# Patient Record
Sex: MALE | Race: WHITE | Employment: FULL TIME | URBAN - METROPOLITAN AREA
[De-identification: names, ages, dates, MRNs, and addresses within clinical notes are randomized per-mention and may not be internally consistent; named-entity substitution may affect disease eponyms.]

---

## 2017-01-19 ENCOUNTER — ALLSCRIPTS OFFICE VISIT (OUTPATIENT)
Dept: OTHER | Facility: OTHER | Age: 52
End: 2017-01-19

## 2017-01-30 ENCOUNTER — GENERIC CONVERSION - ENCOUNTER (OUTPATIENT)
Dept: OTHER | Facility: OTHER | Age: 52
End: 2017-01-30

## 2017-05-08 ENCOUNTER — OFFICE VISIT (OUTPATIENT)
Dept: URGENT CARE | Facility: CLINIC | Age: 52
End: 2017-05-08
Payer: COMMERCIAL

## 2017-05-08 PROCEDURE — 99213 OFFICE O/P EST LOW 20 MIN: CPT

## 2017-06-06 ENCOUNTER — ALLSCRIPTS OFFICE VISIT (OUTPATIENT)
Dept: OTHER | Facility: OTHER | Age: 52
End: 2017-06-06

## 2017-10-02 DIAGNOSIS — Z82.49 FAMILY HISTORY OF ISCHEMIC HEART DISEASE AND OTHER DISEASES OF THE CIRCULATORY SYSTEM: ICD-10-CM

## 2017-10-02 DIAGNOSIS — Z12.5 ENCOUNTER FOR SCREENING FOR MALIGNANT NEOPLASM OF PROSTATE: ICD-10-CM

## 2017-10-02 DIAGNOSIS — Z00.00 ENCOUNTER FOR GENERAL ADULT MEDICAL EXAMINATION WITHOUT ABNORMAL FINDINGS: ICD-10-CM

## 2017-10-09 ENCOUNTER — ALLSCRIPTS OFFICE VISIT (OUTPATIENT)
Dept: OTHER | Facility: OTHER | Age: 52
End: 2017-10-09

## 2017-10-10 ENCOUNTER — ALLSCRIPTS OFFICE VISIT (OUTPATIENT)
Dept: OTHER | Facility: OTHER | Age: 52
End: 2017-10-10

## 2018-01-11 NOTE — PROGRESS NOTES
Assessment    1  Encounter for preventive health examination (V70 0) (Z00 00)    Plan  Health Maintenance    · Call [phone] if: You have any warning signs of skin cancer ; Status:Complete;    Done: 89TPI1271   · Call 911 if: You experience a new kind of chest pain (angina) or pressure ;  Status:Complete;   Done: 17GEZ1694   · Always use a seat belt and shoulder strap when riding or driving a motor vehicle ;  Status:Complete;   Done: 69JAN5650   · Begin a limited exercise program ; Status:Complete;   Done: 98LLT0518   · Begin or continue regular aerobic exercise  Gradually work up to at least 3 sessions of 30  minutes of exercise a week ; Status:Complete;   Done: 69RSW7305   · Decreasing the stress in your life may help your condition improve ; Status:Complete;    Done: 74FCN0836   · Eat a low fat and low cholesterol diet ; Status:Complete;   Done: 48ZGN0718   · Eat no more than 30 grams of fat per day ; Status:Complete;   Done: 02KLN9443   · Regular aerobic exercise can help reduce stress ; Status:Complete;   Done: 89JQR4775   · Some eating tips that can help you lose weight ; Status:Complete;   Done: 99VVC6521   · Stretch and warm up your muscles during the first 10 minutes , then cool down your  muscles for the last 10 minutes of exercise ; Status:Complete;   Done: 41EJA0061   · Use a sun block product with an SPF of 15 or more ; Status:Complete;   Done:  79CLZ8894   · We encourage all of our patients to exercise regularly    30 minutes of exercise or physical  activity five or more days a week is recommended for children and adults ;  Status:Complete;   Done: 96THO9335   · We recommend routine visits to a dentist ; Status:Complete;   Done: 93JEV3512   · Follow-up visit in 1 year Evaluation and Treatment  Follow-up  Status: Hold For -  Scheduling  Requested for: 10Oct2017  Insomnia    · Zolpidem Tartrate 5 MG Oral Tablet   · TraZODone HCl - 50 MG Oral Tablet; TAKE 1 TABLET Bedtime  Multiple nevi    · 2 - Franki CUEVAS, Diamond Hyde (Dermatology) Co-Management  *  Status: Active  Requested  for: 61ANN9099  Care Summary provided  : Yes  Screening for colon cancer    · COLONOSCOPY; Status:Active; Requested for:10Oct2017;   SocHx: Current Every Day Smoker    · You need to quit smoking ; Status:Complete;   Done: 76JHY3664  SocHx: Current Every Day Smoker, FamHx: Family history of cardiac disorder    · CT CORONARY CALCIUM SCORE; Status:Active; Requested for:10Oct2017;     Discussion/Summary  health maintenance visit Currently, he eats a healthy diet and has an adequate exercise regimen  Prostate cancer screening: the risks and benefits of prostate cancer screening were discussed and prostate cancer screening is current  Testicular cancer screening: the risks and benefits of testicular cancer screening were discussed and self testicular exam technique was taught  Colorectal cancer screening: the risks and benefits of colorectal cancer screening were discussed and colonoscopy has been ordered  The risks and benefits of immunizations were discussed and patient declines immunizations  He was advised to be evaluated by an optometrist and a dentist  Advice and education were given regarding nutrition, self skin examination, aerobic exercise, cardiovascular risk reduction, weight bearing exercise, seat belt use, weight loss, fall risk reduction, calcium supplements, sunscreen use, advanced directive planning and vitamin D supplements  Kat Avery is stable on his current medications  We are going to discontinue the zolpidem and change in to the trazodone see if that helps better with his sleep  He will go for the testing as ordered  He is going to work on smoking cessation  He will continue to work on his diet and exercise  We will see him back as scheduled  Possible side effects of new medications were reviewed with the patient/guardian today  The treatment plan was reviewed with the patient/guardian   The patient/guardian understands and agrees with the treatment plan      Chief Complaint  Complete Physical 47year old male      History of Present Illness  HM, Adult Male: The patient is being seen for a health maintenance evaluation  General Health: The patient's health since the last visit is described as good  He does not have regular dental visits  He denies vision problems  Vision care includes wearing glasses and an eye examination within the last year  He denies hearing loss  Lifestyle:  He consumes a diverse and healthy diet  He does not have any weight concerns  He exercises regularly  He uses tobacco  The patient is a current cigarette smoker  Tobacco Use Duration: 1/2 cigarette pack(s) per day  He is ready to quit using tobacco, does not want resources to help with quitting and He is working on weaning of the cigarettes and is going to take Chantix  He denies alcohol use  He denies drug use  Reproductive health:  the patient is not sexually active  birth control is not being practiced  He denies erectile dysfunction  Screening: cancer screening reviewed and current  metabolic screening reviewed and current  risk screening reviewed and current  HPI: Jeremy Krause is a 50-year-old male who presents today for a complete physical  He is feeling well  The patient denies any chest pain, shortness of breath, or palpitations  There is no edema  There are no headaches or visual changes  There is no lightheadedness, dizziness, or fainting spells  There is no abdominal pain  There is no chest pain or dyspnea with exertion  He is doing well on his medication  He is still having trouble falling asleep and staying asleep  The zolpidem is no longer working for him  He is interested in alternative  He is working on trying to quit smoking on his own by gradually weaning off of the cigarettes  He does have Chantix at home which she is going to restart        Review of Systems    Constitutional: No fever or chills, feels well, no tiredness, no recent weight gain or weight loss  Eyes: No complaints of eye pain, no red eyes, no discharge from eyes, no itchy eyes  ENT: no complaints of earache, no hearing loss, no nosebleeds, no nasal discharge, no sore throat, no hoarseness  Cardiovascular: No complaints of slow heart rate, no fast heart rate, no chest pain, no palpitations, no leg claudication, no lower extremity  Respiratory: No complaints of shortness of breath, no wheezing, no cough, no SOB on exertion, no orthopnea or PND  Gastrointestinal: No complaints of abdominal pain, no constipation, no nausea or vomiting, no diarrhea or bloody stools  Genitourinary: No complaints of dysuria, no incontinence, no hesitancy, no nocturia, no genital lesion, no testicular pain  Musculoskeletal: as noted in HPI  Active Problems    1  Allergic rhinitis (477 9) (J30 9)   2  Anxiety disorder (300 00) (F41 9)   3  Blood tests for routine general physical examination (V72 62) (Z00 00)   4  COPD (chronic obstructive pulmonary disease) (496) (J44 9)   5  COPD exacerbation (491 21) (J44 1)   6  Current Every Day Smoker (305 1)   7  Insomnia (780 52) (G47 00)   8   Nicotine dependence (305 1) (F17 200)    Past Medical History    · History of Anxiety disorder (300 00) (F41 9)   · History of Cough (786 2) (R05)   · History of Degeneration of cervical intervertebral disc (722 4) (M50 90)   · History of Head Injury (959 01)   · History of acute sinusitis (V12 69) (Z87 09)   · History of depression (V11 8) (Z86 59)   · History of gastroenteritis (V12 79) (Z87 19)   · History of influenza (V12 09) (Z87 09)   · History of sinusitis (V12 69) (Z87 09)   · History of viral gastroenteritis (V12 09) (Z86 19)   · History of Impingement syndrome of left shoulder (726 2) (M75 42)   · History of Left Shoulder Strain (840 9)   · History of Right Shoulder Strain (840 9)   · History of Strain of left shoulder, initial encounter (840 9) (S03 464J)    Surgical History    · History of Hand Surgery    Family History  Mother    · Family history of cardiac disorder (V17 49) (Z80 55)  Family History    · Family history of cardiac disorder (V17 49) (Z82 49)    Social History    · Alcohol use   · Current Every Day Smoker (305 1)   · 1/2-3/4 ppd  · Employed in professional specialty position   · Lives with spouse   ·    · No drug use   · Denied: History of Uses  drugs    Current Meds   1  BusPIRone HCl - 15 MG Oral Tablet; TAKE 1 TABLET TWICE DAILY; Therapy: 87BYS4388 to (Evaluate:07Apr2018)  Requested for: 73BAA5054; Last   Rx:09Oct2017 Ordered   2  Zolpidem Tartrate 5 MG Oral Tablet; TAKE 1 TABLET AT  BEDTIME AS NEEDED FOR   INSOMNIA; Therapy: 55Hkd4564 to (Evaluate:07Jan2018)  Requested for: 81JMI5766; Last   Rx:09Oct2017 Ordered    Allergies    1  No Known Allergies    Vitals   Recorded: 18AOT1443 05:23PM   Temperature 99 1 F, Tympanic   Heart Rate 96, R Radial   Pulse Quality Regular, R Radial   Systolic 111, RUE, Sitting   Diastolic 80, RUE, Sitting   Height 5 ft 9 in   Weight 178 lb    BMI Calculated 26 29   BSA Calculated 1 97     Physical Exam    Constitutional   General appearance: No acute distress, well appearing and well nourished  Eyes   Conjunctiva and lids: No erythema, swelling or discharge  Pupils and irises: Equal, round, reactive to light  Ophthalmoscopic examination: Normal fundi and optic discs  Ears, Nose, Mouth, and Throat   External inspection of ears and nose: Normal     Otoscopic examination: Tympanic membranes translucent with normal light reflex  Canals patent without erythema  Hearing: Normal     Nasal mucosa, septum, and turbinates: Normal without edema or erythema  Lips, teeth, and gums: Normal, good dentition  Oropharynx: Normal with no erythema, edema, exudate or lesions  Neck   Neck: Supple, symmetric, trachea midline, no masses  Thyroid: Normal, no thyromegaly      Pulmonary   Respiratory effort: No increased work of breathing or signs of respiratory distress  Percussion of chest: Normal     Palpation of chest: Normal     Auscultation of lungs: Clear to auscultation  Cardiovascular   Palpation of heart: Normal PMI, no thrills  Auscultation of heart: Normal rate and rhythm, normal S1 and S2, no murmurs  Carotid pulses: 2+ bilaterally  Abdominal aorta: Normal     Femoral pulses: 2+ bilaterally  Pedal pulses: 2+ bilaterally  Examination of extremities for edema and/or varicosities: Normal     Chest   Breasts: Normal, no dimpling or skin changes appreciated  Palpation of breasts and axillae: Normal, no masses palpated  Chest: Normal     Abdomen   Abdomen: Non-tender, no masses  Liver and spleen: No hepatomegaly or splenomegaly  Examination for hernias: No hernias appreciated  Anus, perineum, and rectum: Normal sphincter tone, no masses, no prolapse  Stool sample for occult blood: Negative  Genitourinary   Scrotal contents: Normal testes, no masses  Penis: Normal, no lesions  Digital rectal exam of prostate: Normal size, no masses  Lymphatic   Palpation of lymph nodes in neck: No lymphadenopathy  Palpation of lymph nodes in axillae: No lymphadenopathy  Palpation of lymph nodes in groin: No lymphadenopathy  Palpation of lymph nodes in other areas: No lymphadenopathy  Musculoskeletal   Gait and station: Normal     Inspection/palpation of digits and nails: Normal without clubbing or cyanosis  Inspection/palpation of joints, bones, and muscles: Normal     Range of motion: Normal     Stability: Normal     Muscle strength/tone: Normal     Skin   Skin and subcutaneous tissue: Normal without rashes or lesions  Palpation of skin and subcutaneous tissue: Normal turgor  Neurologic   Cranial nerves: Cranial nerves 2-12 intact  Reflexes: 2+ and symmetric  Sensation: No sensory loss      Psychiatric   Judgment and insight: Normal     Orientation to person, place and time: Normal     Recent and remote memory: Intact  Mood and affect: Normal        Procedure    Procedure: Visual Acuity Test    Indication: routine screening  Inforrmation supplied by a Snellen chart  Results: 20/30 in both eyes with corrective device, 20/30 in the right eye with corrective device, 20/100 in the left eye with corrective device 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

## 2018-01-12 VITALS
HEIGHT: 69 IN | RESPIRATION RATE: 16 BRPM | WEIGHT: 182 LBS | HEART RATE: 100 BPM | BODY MASS INDEX: 26.96 KG/M2 | SYSTOLIC BLOOD PRESSURE: 122 MMHG | DIASTOLIC BLOOD PRESSURE: 86 MMHG | TEMPERATURE: 97.9 F

## 2018-01-14 VITALS
HEART RATE: 96 BPM | BODY MASS INDEX: 26.36 KG/M2 | HEIGHT: 69 IN | DIASTOLIC BLOOD PRESSURE: 80 MMHG | WEIGHT: 178 LBS | TEMPERATURE: 99.1 F | SYSTOLIC BLOOD PRESSURE: 120 MMHG

## 2018-01-15 VITALS
TEMPERATURE: 99.8 F | WEIGHT: 179 LBS | DIASTOLIC BLOOD PRESSURE: 90 MMHG | HEART RATE: 114 BPM | BODY MASS INDEX: 26.51 KG/M2 | HEIGHT: 69 IN | SYSTOLIC BLOOD PRESSURE: 110 MMHG

## 2018-01-15 NOTE — MISCELLANEOUS
Message   Recorded as Task   Date: 08/02/2016 03:55 PM, Created By: Adalid Rhodes   Task Name: Med Renewal Request   Assigned To: Mónica Corcoran   Regarding Patient: Rogelio Lazcano, Status: Active   Comment: Adalid Rhodes - 81 Aug 2016 3:55 PM     TASK CREATED  Caller: Self; Renew Medication; [phone] (Home); [phone] (Work)  PT NEEDS BUSPIRONE AND AMBIEN  TO [facility] ALMOST ALL OUT OF MEDS   Candelaria Rain - 02 Aug 2016 5:17 PM     TASK REASSIGNED: Previously Assigned To Candelaria Rain      Call in the [address], [address], [facility]-I sent inthe Buspar  The patient is due for a physical and labs  He should schedule before his next med refill  Mónica Corcoran - 02 Aug 2016 6:56 PM     TASK EDITED  Message left - call office  PLM   Mónica Corcoran - 03 Aug 2016 4:01 PM     TASK EDITED  Patient advised  PLM        Active Problems    1  Adult Onset Fluency Disorder (307 0)   2  Anxiety disorder (300 00) (F41 9)   3  Degeneration of cervical intervertebral disc (722 4) (M50 90)   4  Head Injury (959 01)   5  Impingement syndrome of left shoulder (726 2) (M75 42)   6  Insomnia (780 52) (G47 00)   7  Left shoulder pain (719 41) (M25 512)   8  Lower back pain (724 2) (M54 5)   9  Neck pain (723 1) (M54 2)   10  Other muscle spasm (728 85) (M62 838)   11  Strain of left shoulder, initial encounter (840 9) (S94 449M)    Current Meds   1  BusPIRone HCl - 15 MG Oral Tablet; TAKE 1 TABLET TWICE DAILY; Therapy: 33UAD5118 to (Evaluate:29Jan2017)  Requested for: 84Fwi4046; Last   Rx:42Exh7177 Ordered   2  MethylPREDNISolone (Karan) 4 MG TABS; TAKE AS DIRECTED ON PATIENT   INSTRUCTION CARD; Therapy: 86XKR1877 to (Last Rx:05Oct2015)  Requested for: 05Oct2015 Ordered   3  Zolpidem Tartrate 5 MG Oral Tablet; TAKE 1 TABLET AT  BEDTIME AS NEEDED FOR   INSOMNIA; Therapy: 34Ksy7807 to (Evaluate:31Oct2016)  Requested for: 09Vyf6651; Last   Rx:87Lmv9503 Ordered    Allergies    1   No Known Allergies    Signatures Electronically signed by : Solis Orozco, ; Aug  3 2016  4:01PM EST                       (Author)

## 2018-01-17 NOTE — MISCELLANEOUS
Date: 01/30/2017   Dear Ty Tubbs:     We have attempted to reach you regarding your upcoming appointment on 03/21/17 and with  [facility]     Unfortunately, due to a change in the provider's schedule we need to change your appointment  Please call our office as soon as possible so we can reschedule your appointment  We apologize for any inconvenience this may cause  Thank you in advance for your cooperation and assistance       Sincerely,   MESSAGE WAS LEFT 01/18/17      Signatures   Electronically signed by : Andre Flores DO; Jan 30 2017  5:42PM EST                       (Author)

## 2018-03-04 RX ORDER — TRAZODONE HYDROCHLORIDE 50 MG/1
TABLET ORAL
Qty: 30 TABLET | Refills: 2 | OUTPATIENT
Start: 2018-03-04

## 2018-04-03 RX ORDER — BUSPIRONE HYDROCHLORIDE 15 MG/1
TABLET ORAL
Qty: 180 TABLET | Refills: 1 | OUTPATIENT
Start: 2018-04-03

## 2018-05-14 ENCOUNTER — TELEPHONE (OUTPATIENT)
Dept: FAMILY MEDICINE CLINIC | Facility: CLINIC | Age: 53
End: 2018-05-14

## 2018-05-14 DIAGNOSIS — F41.1 GENERALIZED ANXIETY DISORDER: Primary | ICD-10-CM

## 2018-05-14 PROBLEM — J44.9 COPD (CHRONIC OBSTRUCTIVE PULMONARY DISEASE) (HCC): Status: ACTIVE | Noted: 2017-06-06

## 2018-05-14 PROBLEM — J30.9 ALLERGIC RHINITIS: Status: ACTIVE | Noted: 2017-06-06

## 2018-05-14 PROBLEM — D22.9 MULTIPLE NEVI: Status: ACTIVE | Noted: 2017-10-10

## 2018-05-14 RX ORDER — BUSPIRONE HYDROCHLORIDE 15 MG/1
15 TABLET ORAL 2 TIMES DAILY
Qty: 180 TABLET | Refills: 1 | Status: SHIPPED | OUTPATIENT
Start: 2018-05-14 | End: 2018-12-26 | Stop reason: SDUPTHER

## 2018-05-14 RX ORDER — BUSPIRONE HYDROCHLORIDE 15 MG/1
1 TABLET ORAL 2 TIMES DAILY
COMMUNITY
Start: 2012-11-13 | End: 2018-05-14 | Stop reason: SDUPTHER

## 2018-08-30 DIAGNOSIS — Z11.59 NEED FOR HEPATITIS C SCREENING TEST: ICD-10-CM

## 2018-08-30 DIAGNOSIS — Z13.6 SCREENING FOR CARDIOVASCULAR CONDITION: Primary | ICD-10-CM

## 2018-08-30 DIAGNOSIS — Z12.5 SCREENING FOR PROSTATE CANCER: ICD-10-CM

## 2018-08-30 DIAGNOSIS — Z13.1 SCREENING FOR DIABETES MELLITUS: ICD-10-CM

## 2018-11-16 DIAGNOSIS — F41.1 GENERALIZED ANXIETY DISORDER: ICD-10-CM

## 2018-11-16 RX ORDER — BUSPIRONE HYDROCHLORIDE 15 MG/1
15 TABLET ORAL 2 TIMES DAILY
Qty: 180 TABLET | Refills: 1 | OUTPATIENT
Start: 2018-11-16

## 2018-12-26 DIAGNOSIS — F41.1 GENERALIZED ANXIETY DISORDER: Primary | ICD-10-CM

## 2018-12-26 RX ORDER — TRAZODONE HYDROCHLORIDE 50 MG/1
1 TABLET ORAL
COMMUNITY
Start: 2017-10-10 | End: 2018-12-26 | Stop reason: SDUPTHER

## 2018-12-26 NOTE — TELEPHONE ENCOUNTER
busPIRone (BUSPAR) 15 mg tablet  BID  TRAZIDONE          [facility]  [phone]        [phone]  ANY QUESTIONS

## 2018-12-27 ENCOUNTER — DOCUMENTATION (OUTPATIENT)
Dept: FAMILY MEDICINE CLINIC | Facility: CLINIC | Age: 53
End: 2018-12-27

## 2018-12-27 DIAGNOSIS — F41.1 GENERALIZED ANXIETY DISORDER: ICD-10-CM

## 2018-12-27 RX ORDER — TRAZODONE HYDROCHLORIDE 50 MG/1
50 TABLET ORAL
Qty: 90 TABLET | Refills: 0 | Status: SHIPPED | OUTPATIENT
Start: 2018-12-27 | End: 2018-12-27 | Stop reason: SDUPTHER

## 2018-12-27 RX ORDER — BUSPIRONE HYDROCHLORIDE 30 MG/1
TABLET ORAL
Qty: 90 TABLET | Refills: 0 | Status: SHIPPED | OUTPATIENT
Start: 2018-12-27 | End: 2019-04-01 | Stop reason: SDUPTHER

## 2018-12-27 RX ORDER — TRAZODONE HYDROCHLORIDE 50 MG/1
50 TABLET ORAL
Qty: 90 TABLET | Refills: 0 | Status: SHIPPED | OUTPATIENT
Start: 2018-12-27 | End: 2019-05-15 | Stop reason: SDUPTHER

## 2018-12-27 RX ORDER — BUSPIRONE HYDROCHLORIDE 15 MG/1
15 TABLET ORAL 2 TIMES DAILY
Qty: 180 TABLET | Refills: 0 | Status: SHIPPED | OUTPATIENT
Start: 2018-12-27 | End: 2018-12-27 | Stop reason: SDUPTHER

## 2018-12-27 RX ORDER — BUSPIRONE HYDROCHLORIDE 15 MG/1
15 TABLET ORAL 2 TIMES DAILY
Qty: 180 TABLET | Refills: 1 | Status: SHIPPED | OUTPATIENT
Start: 2018-12-27 | End: 2019-01-15 | Stop reason: ALTCHOICE

## 2018-12-27 NOTE — TELEPHONE ENCOUNTER
I will refill Rx on behalf of Dr Kamilla Smith, however, patient has not been seen in over a year and needs to make an apt with Dr Kamilla Smith his PCP for check up

## 2018-12-27 NOTE — PROGRESS NOTES
Called and left patient a detailed message that scripts were sent to [facility] in [place]  Instructed patient that they only have 30MG of buspirone and he needs to take half a tablet twice daily

## 2019-01-15 ENCOUNTER — OFFICE VISIT (OUTPATIENT)
Dept: FAMILY MEDICINE CLINIC | Facility: CLINIC | Age: 54
End: 2019-01-15
Payer: COMMERCIAL

## 2019-01-15 VITALS
TEMPERATURE: 98.4 F | SYSTOLIC BLOOD PRESSURE: 120 MMHG | WEIGHT: 196 LBS | HEIGHT: 72 IN | OXYGEN SATURATION: 96 % | DIASTOLIC BLOOD PRESSURE: 80 MMHG | BODY MASS INDEX: 26.55 KG/M2 | HEART RATE: 104 BPM

## 2019-01-15 DIAGNOSIS — F51.01 PRIMARY INSOMNIA: ICD-10-CM

## 2019-01-15 DIAGNOSIS — Z12.5 SCREENING FOR PROSTATE CANCER: ICD-10-CM

## 2019-01-15 DIAGNOSIS — R42 LIGHTHEADEDNESS: ICD-10-CM

## 2019-01-15 DIAGNOSIS — Z12.11 SCREENING FOR COLON CANCER: ICD-10-CM

## 2019-01-15 DIAGNOSIS — F17.210 CIGARETTE NICOTINE DEPENDENCE WITHOUT COMPLICATION: ICD-10-CM

## 2019-01-15 DIAGNOSIS — F41.1 GENERALIZED ANXIETY DISORDER: Primary | ICD-10-CM

## 2019-01-15 PROCEDURE — 99214 OFFICE O/P EST MOD 30 MIN: CPT | Performed by: FAMILY MEDICINE

## 2019-01-15 PROCEDURE — 3008F BODY MASS INDEX DOCD: CPT | Performed by: FAMILY MEDICINE

## 2019-01-15 NOTE — PROGRESS NOTES
Assessment/Plan:  1  Generalized anxiety disorder-patient will continue on the buspirone as prescribed  He has had no breakthrough anxiety symptoms  He will go for the lab work as ordered and will follow up with the results  2  Primary insomnia-the patient is doing well on the trazodone and we will continue him on this current dosage  3  History of nicotine dependence-I advised against using Chantix or Wellbutrin for smoking cessation due to the patient's history of anxiety  He is going to try the over-the-counter patch to help with smoking cessation  He will gradually wean off of his cigarettes  He will go for the lung cancer screening CT as ordered and will follow up with results  4  Intermittent lightheadedness-the patient will go for the lab work as ordered and will follow up with the results  We will see him back in the office as scheduled  Diagnoses and all orders for this visit:    Generalized anxiety disorder  -     CBC and differential; Future  -     Comprehensive metabolic panel; Future  -     LDL cholesterol, direct; Future  -     Lipid panel; Future  -     TSH, 3rd generation with Free T4 reflex; Future  -     Urinalysis with reflex to microscopic; Future    Primary insomnia  -     CBC and differential; Future  -     Comprehensive metabolic panel; Future  -     LDL cholesterol, direct; Future  -     Lipid panel; Future  -     TSH, 3rd generation with Free T4 reflex; Future  -     Urinalysis with reflex to microscopic; Future    Cigarette nicotine dependence without complication  -     CT lung screening program; Future    Lightheadedness  -     CBC and differential; Future  -     Comprehensive metabolic panel; Future  -     LDL cholesterol, direct; Future  -     Lipid panel; Future  -     TSH, 3rd generation with Free T4 reflex; Future  -     Urinalysis with reflex to microscopic; Future    Screening for prostate cancer  -     PSA, ultrasensitive;  Future    Screening for colon cancer  - Ambulatory referral to Gastroenterology; Future       No Follow-up on file  Subjective:   Chief Complaint   Patient presents with    Follow-up     med check         Patient ID: Ana Aguilar is a 53 y o  male presents today for a routine checkup  Ana Aguilar is a 53 y o  Male who presents for a checkup today  He has been having dizzy episodes over the past 3-4 days  There was no spinning  He felt lightheaded for a few days  He feels better today  The patient denies any chest pain, shortness of breath, or palpitations  There is no edema  There are no headaches or visual changes  There is no lightheadedness, dizziness, or fainting spells  There are no fevers or chills  There is no coughing  There are no GI problems  There is relief with his sleep of the trazodone  His sleep is better  His breathing is good  He needs a colonoscopy  He want's to quit smoking  He is not interested in taking the Chantix because the risk of side effects  He is interested in other options        The following portions of the patient's history were reviewed and updated as appropriate: allergies, current medications, past family history, past medical history, past social history, past surgical history and problem list   Patient Active Problem List   Diagnosis    Allergic rhinitis    Anxiety disorder    COPD (chronic obstructive pulmonary disease) ([facility] [address] )    Insomnia    Multiple nevi    Nicotine dependence     Past Medical History:   Diagnosis Date    Anxiety disorder     last assessed - 41Wef0039    Degeneration of cervical intervertebral disc     last assessed - 83NOU6527    Depression     Gastroenteritis     last assessed - 72Hxg7470    Head injury     last assessed - 97Kig4533    Impingement syndrome of left shoulder     last assessed - 94Vfs5744     Past Surgical History:   Procedure Laterality Date    HAND SURGERY       No Known Allergies  Family History   Problem Relation Age of Onset    Heart disease Mother         Cardiac disorder    Heart disease Family         Cardiac disorder     Social History     Social History    Marital status: /Civil Union     Spouse name: N/A    Number of children: N/A    Years of education: N/A     Occupational History    Professional specialty position      Social History Main Topics    Smoking status: Current Every Day Smoker    Smokeless tobacco: Never Used    Alcohol use Yes    Drug use: No      Comment: Denied history uses  drugs    Sexual activity: Not on file     Other Topics Concern    Not on file     Social History Narrative    Lives with spouse     Current Outpatient Prescriptions on File Prior to Visit   Medication Sig Dispense Refill    busPIRone (BUSPAR) 30 MG tablet Take 1/2 tablet twice daily 90 tablet 0    traZODone (DESYREL) 50 mg tablet Take 1 tablet (50 mg total) by mouth daily at bedtime 90 tablet 0    [DISCONTINUED] busPIRone (BUSPAR) 15 mg tablet Take 1 tablet (15 mg total) by mouth 2 (two) times a day 180 tablet 1     No current facility-administered medications on file prior to visit  Review of Systems   Constitutional: Negative  HENT: Negative  Eyes: Negative  Respiratory: Negative  Cardiovascular: Negative  Gastrointestinal: Negative  Endocrine: Negative  Genitourinary: Negative  Musculoskeletal: Negative  Skin: Negative  Allergic/Immunologic: Negative  Neurological: Negative  Hematological: Negative  Psychiatric/Behavioral: Negative  Objective:  Vitals:    01/15/19 1626   BP: 120/80   BP Location: Left arm   Patient Position: Sitting   Cuff Size: Large   Pulse: 104   Temp: 98 4 °F (36 9 °C)   TempSrc: Tympanic   SpO2: 96%   Weight: 88 9 kg (196 lb)   Height: 6' (1 829 m)     Body mass index is 26 58 kg/m²    Wt Readings from Last 3 Encounters:   01/15/19 88 9 kg (196 lb)   10/10/17 80 7 kg (178 lb)   06/06/17 81 2 kg (179 lb)     Temp Readings from Last 3 Encounters:   01/15/19 98 4 °F (36 9 °C) (Tympanic)   10/10/17 99 1 °F (37 3 °C) (Tympanic)   06/06/17 99 8 °F (37 7 °C) (Tympanic)     BP Readings from Last 3 Encounters:   01/15/19 120/80   10/10/17 120/80   06/06/17 110/90     Pulse Readings from Last 3 Encounters:   01/15/19 104   10/10/17 96   06/06/17 (!) 114        Physical Exam   Constitutional: He is oriented to person, place, and time  He appears well-developed and well-nourished  No distress  Eyes: Pupils are equal, round, and reactive to light  EOM are normal    Neck: Normal range of motion  Neck supple  No JVD present  No tracheal deviation present  No thyromegaly present  Cardiovascular: Normal rate, regular rhythm and normal heart sounds  Exam reveals no gallop and no friction rub  No murmur heard  Pulmonary/Chest: Effort normal and breath sounds normal  No stridor  No respiratory distress  He has no wheezes  He has no rales  He exhibits no tenderness  Abdominal: Soft  Bowel sounds are normal  He exhibits no distension and no mass  There is no tenderness  There is no rebound and no guarding  Musculoskeletal: Normal range of motion  Lymphadenopathy:     He has no cervical adenopathy  Neurological: He is alert and oriented to person, place, and time  He has normal reflexes  No cranial nerve deficit  He exhibits normal muscle tone  Coordination normal    Skin: Skin is warm and dry  No rash noted  He is not diaphoretic  No erythema  No pallor  Nursing note and vitals reviewed

## 2019-01-15 NOTE — PATIENT INSTRUCTIONS

## 2019-04-01 ENCOUNTER — TELEPHONE (OUTPATIENT)
Dept: FAMILY MEDICINE CLINIC | Facility: CLINIC | Age: 54
End: 2019-04-01

## 2019-04-01 DIAGNOSIS — F41.1 GENERALIZED ANXIETY DISORDER: ICD-10-CM

## 2019-04-01 RX ORDER — BUSPIRONE HYDROCHLORIDE 30 MG/1
TABLET ORAL
Qty: 14 TABLET | Refills: 0 | Status: SHIPPED | OUTPATIENT
Start: 2019-04-01 | End: 2019-05-15 | Stop reason: SDUPTHER

## 2019-05-15 DIAGNOSIS — F41.1 GENERALIZED ANXIETY DISORDER: ICD-10-CM

## 2019-05-15 RX ORDER — TRAZODONE HYDROCHLORIDE 50 MG/1
50 TABLET ORAL
Qty: 90 TABLET | Refills: 1 | Status: SHIPPED | OUTPATIENT
Start: 2019-05-15 | End: 2021-08-12 | Stop reason: SDUPTHER

## 2019-05-15 RX ORDER — BUSPIRONE HYDROCHLORIDE 30 MG/1
TABLET ORAL
Qty: 90 TABLET | Refills: 1 | Status: SHIPPED | OUTPATIENT
Start: 2019-05-15 | End: 2019-12-23 | Stop reason: SDUPTHER

## 2019-10-31 DIAGNOSIS — F41.1 GENERALIZED ANXIETY DISORDER: ICD-10-CM

## 2019-10-31 RX ORDER — TRAZODONE HYDROCHLORIDE 50 MG/1
50 TABLET ORAL
Qty: 90 TABLET | Refills: 1 | OUTPATIENT
Start: 2019-10-31

## 2019-10-31 RX ORDER — BUSPIRONE HYDROCHLORIDE 30 MG/1
TABLET ORAL
Qty: 90 TABLET | Refills: 1 | OUTPATIENT
Start: 2019-10-31

## 2019-12-23 ENCOUNTER — TELEPHONE (OUTPATIENT)
Dept: FAMILY MEDICINE CLINIC | Facility: CLINIC | Age: 54
End: 2019-12-23

## 2019-12-23 DIAGNOSIS — F41.1 GENERALIZED ANXIETY DISORDER: ICD-10-CM

## 2019-12-23 RX ORDER — BUSPIRONE HYDROCHLORIDE 30 MG/1
TABLET ORAL
Qty: 90 TABLET | Refills: 1 | Status: SHIPPED | OUTPATIENT
Start: 2019-12-23 | End: 2021-08-12

## 2019-12-23 NOTE — TELEPHONE ENCOUNTER
Patient would like refill of Buspirone 30 mg tab take 1/2 tab twice daily 90 day supply and Trazodone 50 mg tab take 1 tab daily 90 day supply both sent to [facility] in [place]

## 2019-12-29 DIAGNOSIS — F41.1 GENERALIZED ANXIETY DISORDER: ICD-10-CM

## 2019-12-30 RX ORDER — TRAZODONE HYDROCHLORIDE 50 MG/1
50 TABLET ORAL
Qty: 90 TABLET | Refills: 1 | OUTPATIENT
Start: 2019-12-30

## 2020-03-03 ENCOUNTER — OFFICE VISIT (OUTPATIENT)
Dept: FAMILY MEDICINE CLINIC | Facility: CLINIC | Age: 55
End: 2020-03-03
Payer: COMMERCIAL

## 2020-03-03 VITALS
HEART RATE: 100 BPM | SYSTOLIC BLOOD PRESSURE: 110 MMHG | DIASTOLIC BLOOD PRESSURE: 80 MMHG | BODY MASS INDEX: 23.98 KG/M2 | TEMPERATURE: 97.6 F | WEIGHT: 177 LBS | HEIGHT: 72 IN | OXYGEN SATURATION: 95 %

## 2020-03-03 DIAGNOSIS — R11.2 NAUSEA AND VOMITING, INTRACTABILITY OF VOMITING NOT SPECIFIED, UNSPECIFIED VOMITING TYPE: Primary | ICD-10-CM

## 2020-03-03 DIAGNOSIS — K52.9 GASTROENTERITIS: ICD-10-CM

## 2020-03-03 PROCEDURE — 99213 OFFICE O/P EST LOW 20 MIN: CPT | Performed by: FAMILY MEDICINE

## 2020-03-03 PROCEDURE — 3008F BODY MASS INDEX DOCD: CPT | Performed by: FAMILY MEDICINE

## 2020-03-03 RX ORDER — ONDANSETRON 4 MG/1
4 TABLET, ORALLY DISINTEGRATING ORAL EVERY 8 HOURS PRN
Qty: 20 TABLET | Refills: 0 | Status: SHIPPED | OUTPATIENT
Start: 2020-03-03 | End: 2021-08-12

## 2020-03-03 NOTE — PROGRESS NOTES
Garima King 1965 male MRN: [id number]    Family Medicine Acute Visit    ASSESSMENT/PLAN  Problem List Items Addressed This Visit        Digestive    Nausea & vomiting - Primary    Relevant Medications    ondansetron (ZOFRAN-ODT) 4 mg disintegrating tablet    Gastroenteritis                No future appointments  SUBJECTIVE  CC: Sore Throat (Cough, sore throat, headache, diarrheaand vomiting for 4 days)      HPI:  Garima King is a 63 y o  male who presents for sick visit,  Started 4 days ago with sore throat, vomiting, cough  Drink water and this is staying down  Urinating normal  No fever  Review of Systems   Constitutional: Negative for chills and fever  HENT: Positive for sore throat  Negative for congestion, postnasal drip, rhinorrhea and sinus pain  Eyes: Negative for photophobia and visual disturbance  Respiratory: Positive for cough  Negative for shortness of breath  Cardiovascular: Negative for chest pain, palpitations and leg swelling  Gastrointestinal: Positive for diarrhea and nausea  Negative for abdominal pain, constipation and vomiting  Genitourinary: Negative for difficulty urinating and dysuria  Musculoskeletal: Negative for arthralgias and myalgias  Skin: Negative for rash  Neurological: Negative for dizziness and syncope         Historical Information   The patient history was reviewed as follows:  Past Medical History:   Diagnosis Date    Anxiety disorder     last assessed - 21Jun2012    Degeneration of cervical intervertebral disc     last assessed - [id number]    Depression     Gastroenteritis     last assessed - [id number]    Head injury     last assessed - [id number]    Impingement syndrome of left shoulder     last assessed - 05Oct2015         Past Surgical History:   Procedure Laterality Date    HAND SURGERY       Family History   Problem Relation Age of Onset    Heart disease Mother         Cardiac disorder    Heart disease Family Cardiac disorder      Social History   Social History     Substance and Sexual Activity   Alcohol Use Yes     Social History     Substance and Sexual Activity   Drug Use No    Comment: Denied history uses  drugs     Social History     Tobacco Use   Smoking Status Current Every Day Smoker   Smokeless Tobacco Never Used   Tobacco Comment    35 years  Medications:     Current Outpatient Medications:     busPIRone (BUSPAR) 30 MG tablet, Take 1/2 tablet twice daily, Disp: 90 tablet, Rfl: 1    traZODone (DESYREL) 50 mg tablet, Take 1 tablet (50 mg total) by mouth daily at bedtime, Disp: 90 tablet, Rfl: 1    ondansetron (ZOFRAN-ODT) 4 mg disintegrating tablet, Take 1 tablet (4 mg total) by mouth every 8 (eight) hours as needed for nausea or vomiting, Disp: 20 tablet, Rfl: 0    No Known Allergies    OBJECTIVE  Vitals:   Vitals:    03/03/20 1138   BP: 110/80   BP Location: Right arm   Patient Position: Sitting   Cuff Size: Large   Pulse: 100   Temp: 97 6 °F (36 4 °C)   TempSrc: Tympanic   SpO2: 95%   Weight: 80 3 kg (177 lb)   Height: 6' (1 829 m)         Physical Exam   Constitutional: He is oriented to person, place, and time  He appears well-developed and well-nourished  HENT:   Head: Normocephalic and atraumatic  Right Ear: External ear normal    Left Ear: External ear normal    Mouth/Throat: Oropharynx is clear and moist    Eyes: Pupils are equal, round, and reactive to light  Conjunctivae and EOM are normal    Neck: Normal range of motion  Neck supple  Cardiovascular: Normal rate, regular rhythm and normal heart sounds  No murmur heard  Pulmonary/Chest: Effort normal and breath sounds normal  No respiratory distress  He has no wheezes  Abdominal: Soft  Bowel sounds are normal  He exhibits no distension  Musculoskeletal: Normal range of motion  He exhibits no edema  Neurological: He is alert and oriented to person, place, and time  Skin: Skin is warm and dry     Psychiatric: He has a normal mood and affect   His behavior is normal                   [place], DO    3/3/2020

## 2020-03-03 NOTE — LETTER
March 3, 2020     Patient: Quinton Gould   YOB: 1965   Date of Visit: 3/3/2020       To Whom it May Concern:    Miguel Narinder is under my professional care  He was seen in my office on 3/3/2020  Please excuse from work from 3/2 and 3/3  He may return to work on 3/4/2020  If you have any questions or concerns, please don't hesitate to call           Sincerely,          DO Josefina        CC: No Recipients

## 2020-06-05 ENCOUNTER — HOSPITAL ENCOUNTER (EMERGENCY)
Facility: HOSPITAL | Age: 55
Discharge: HOME | End: 2020-06-05
Attending: EMERGENCY MEDICINE | Admitting: EMERGENCY MEDICINE
Payer: OTHER MISCELLANEOUS

## 2020-06-05 ENCOUNTER — APPOINTMENT (EMERGENCY)
Dept: RADIOLOGY | Facility: HOSPITAL | Age: 55
End: 2020-06-05
Payer: OTHER MISCELLANEOUS

## 2020-06-05 VITALS
HEART RATE: 78 BPM | DIASTOLIC BLOOD PRESSURE: 80 MMHG | OXYGEN SATURATION: 95 % | TEMPERATURE: 98.3 F | RESPIRATION RATE: 18 BRPM | WEIGHT: 180 LBS | SYSTOLIC BLOOD PRESSURE: 123 MMHG | HEIGHT: 72 IN | BODY MASS INDEX: 24.38 KG/M2

## 2020-06-05 DIAGNOSIS — M25.512 ACUTE PAIN OF LEFT SHOULDER: Primary | ICD-10-CM

## 2020-06-05 PROCEDURE — 99283 EMERGENCY DEPT VISIT LOW MDM: CPT

## 2020-06-05 PROCEDURE — 73030 X-RAY EXAM OF SHOULDER: CPT

## 2020-06-05 PROCEDURE — 99284 EMERGENCY DEPT VISIT MOD MDM: CPT | Performed by: EMERGENCY MEDICINE

## 2020-06-05 RX ORDER — OXYCODONE HYDROCHLORIDE 5 MG/1
5 CAPSULE ORAL EVERY 4 HOURS PRN
Qty: 15 CAPSULE | Refills: 0 | Status: SHIPPED | OUTPATIENT
Start: 2020-06-05 | End: 2020-06-15

## 2020-06-05 RX ORDER — OXYCODONE HYDROCHLORIDE AND ACETAMINOPHEN 5; 325 MG/1; MG/1
1 TABLET ORAL EVERY 6 HOURS PRN
Status: DISCONTINUED | OUTPATIENT
Start: 2020-06-05 | End: 2020-06-05

## 2020-06-05 RX ORDER — OXYCODONE HYDROCHLORIDE AND ACETAMINOPHEN 5; 325 MG/1; MG/1
2 TABLET ORAL ONCE
Status: COMPLETED | OUTPATIENT
Start: 2020-06-05 | End: 2020-06-05

## 2020-06-05 RX ADMIN — OXYCODONE HYDROCHLORIDE AND ACETAMINOPHEN 2 TABLET: 5; 325 TABLET ORAL at 09:36

## 2020-06-09 ENCOUNTER — VBI (OUTPATIENT)
Dept: FAMILY MEDICINE CLINIC | Facility: CLINIC | Age: 55
End: 2020-06-09

## 2021-04-21 ENCOUNTER — IMMUNIZATIONS (OUTPATIENT)
Dept: FAMILY MEDICINE CLINIC | Facility: HOSPITAL | Age: 56
End: 2021-04-21

## 2021-04-21 DIAGNOSIS — Z23 ENCOUNTER FOR IMMUNIZATION: Primary | ICD-10-CM

## 2021-04-21 PROCEDURE — 0001A SARS-COV-2 / COVID-19 MRNA VACCINE (PFIZER-BIONTECH) 30 MCG: CPT

## 2021-04-21 PROCEDURE — 91300 SARS-COV-2 / COVID-19 MRNA VACCINE (PFIZER-BIONTECH) 30 MCG: CPT

## 2021-05-16 ENCOUNTER — IMMUNIZATIONS (OUTPATIENT)
Dept: FAMILY MEDICINE CLINIC | Facility: HOSPITAL | Age: 56
End: 2021-05-16

## 2021-05-16 DIAGNOSIS — Z23 ENCOUNTER FOR IMMUNIZATION: Primary | ICD-10-CM

## 2021-05-16 PROCEDURE — 0002A SARS-COV-2 / COVID-19 MRNA VACCINE (PFIZER-BIONTECH) 30 MCG: CPT

## 2021-05-16 PROCEDURE — 91300 SARS-COV-2 / COVID-19 MRNA VACCINE (PFIZER-BIONTECH) 30 MCG: CPT

## 2021-06-15 ENCOUNTER — VBI (OUTPATIENT)
Dept: ADMINISTRATIVE | Facility: OTHER | Age: 56
End: 2021-06-15

## 2021-08-12 ENCOUNTER — OFFICE VISIT (OUTPATIENT)
Dept: FAMILY MEDICINE CLINIC | Facility: CLINIC | Age: 56
End: 2021-08-12
Payer: COMMERCIAL

## 2021-08-12 VITALS
SYSTOLIC BLOOD PRESSURE: 132 MMHG | TEMPERATURE: 97.8 F | HEART RATE: 96 BPM | WEIGHT: 183 LBS | HEIGHT: 72 IN | DIASTOLIC BLOOD PRESSURE: 82 MMHG | OXYGEN SATURATION: 98 % | BODY MASS INDEX: 24.79 KG/M2 | RESPIRATION RATE: 16 BRPM

## 2021-08-12 DIAGNOSIS — Z13.1 SCREENING FOR DIABETES MELLITUS: ICD-10-CM

## 2021-08-12 DIAGNOSIS — F17.210 CIGARETTE NICOTINE DEPENDENCE WITHOUT COMPLICATION: ICD-10-CM

## 2021-08-12 DIAGNOSIS — Z13.6 SCREENING FOR CARDIOVASCULAR CONDITION: ICD-10-CM

## 2021-08-12 DIAGNOSIS — F41.1 GENERALIZED ANXIETY DISORDER: ICD-10-CM

## 2021-08-12 DIAGNOSIS — Z12.5 SCREENING FOR PROSTATE CANCER: ICD-10-CM

## 2021-08-12 DIAGNOSIS — N52.9 ERECTILE DYSFUNCTION, UNSPECIFIED ERECTILE DYSFUNCTION TYPE: Primary | ICD-10-CM

## 2021-08-12 DIAGNOSIS — Z11.59 ENCOUNTER FOR HEPATITIS C SCREENING TEST FOR LOW RISK PATIENT: ICD-10-CM

## 2021-08-12 DIAGNOSIS — Z23 NEED FOR VACCINATION: ICD-10-CM

## 2021-08-12 PROCEDURE — 99214 OFFICE O/P EST MOD 30 MIN: CPT | Performed by: FAMILY MEDICINE

## 2021-08-12 PROCEDURE — 4004F PT TOBACCO SCREEN RCVD TLK: CPT | Performed by: FAMILY MEDICINE

## 2021-08-12 PROCEDURE — 3725F SCREEN DEPRESSION PERFORMED: CPT | Performed by: FAMILY MEDICINE

## 2021-08-12 PROCEDURE — 90471 IMMUNIZATION ADMIN: CPT | Performed by: FAMILY MEDICINE

## 2021-08-12 PROCEDURE — 90732 PPSV23 VACC 2 YRS+ SUBQ/IM: CPT | Performed by: FAMILY MEDICINE

## 2021-08-12 PROCEDURE — 90715 TDAP VACCINE 7 YRS/> IM: CPT | Performed by: FAMILY MEDICINE

## 2021-08-12 PROCEDURE — 3008F BODY MASS INDEX DOCD: CPT | Performed by: FAMILY MEDICINE

## 2021-08-12 PROCEDURE — 90472 IMMUNIZATION ADMIN EACH ADD: CPT | Performed by: FAMILY MEDICINE

## 2021-08-12 RX ORDER — OXYCODONE HYDROCHLORIDE AND ACETAMINOPHEN 5; 325 MG/1; MG/1
TABLET ORAL
COMMUNITY
Start: 2021-05-25

## 2021-08-12 RX ORDER — SILDENAFIL 50 MG/1
50 TABLET, FILM COATED ORAL DAILY PRN
Qty: 10 TABLET | Refills: 3 | Status: SHIPPED | OUTPATIENT
Start: 2021-08-12

## 2021-08-12 RX ORDER — TRAZODONE HYDROCHLORIDE 50 MG/1
50 TABLET ORAL
Qty: 90 TABLET | Refills: 1 | Status: SHIPPED | OUTPATIENT
Start: 2021-08-12 | End: 2022-03-30

## 2021-08-12 NOTE — PROGRESS NOTES
Assessment/Plan:  Problem List Items Addressed This Visit        Other    Anxiety disorder    Relevant Medications    sildenafil (VIAGRA) 50 MG tablet    traZODone (DESYREL) 50 mg tablet    Other Relevant Orders    CBC and differential    Comprehensive metabolic panel    Lipid Panel with Direct LDL reflex    T4, free    TSH, 3rd generation    Testosterone, free, total    PSA, Total Screen    Prolactin    Hepatitis C Ab W/Refl To HCV RNA, Qn, PCR    Erectile dysfunction - Primary     The patient was given a prescription to try Viagra as ordered  We will send him for the lab work as ordered to look for any underlying causes  We will monitor him closely  I suspect that is the results of his multiple stressors and is transient  We will follow up with the testing when available and have further instructions at that time  Relevant Medications    sildenafil (VIAGRA) 50 MG tablet    traZODone (DESYREL) 50 mg tablet    Other Relevant Orders    CBC and differential    Comprehensive metabolic panel    Lipid Panel with Direct LDL reflex    T4, free    TSH, 3rd generation    Testosterone, free, total    PSA, Total Screen    Prolactin    Hepatitis C Ab W/Refl To HCV RNA, Qn, PCR    Nicotine dependence     Tobacco Cessation Counseling: Tobacco cessation counseling and education was provided  The patient is sincerely urged to quit consumption of tobacco  He is not ready to quit tobacco  The numerous health risks of tobacco consumption were discussed  If he decides to quit, there are a number of helpful adjunctive aids, and he can see me to discuss nicotine replacement therapy, chantix, or bupropion anytime in the future               Other Visit Diagnoses     Screening for prostate cancer        Relevant Orders    PSA, Total Screen    Encounter for hepatitis C screening test for low risk patient        Relevant Orders    Hepatitis C Ab W/Refl To HCV RNA, Qn, PCR    Screening for cardiovascular condition        Relevant Orders    CBC and differential    Comprehensive metabolic panel    Lipid Panel with Direct LDL reflex    T4, free    TSH, 3rd generation    Testosterone, free, total    Screening for diabetes mellitus        Relevant Orders    CBC and differential    Comprehensive metabolic panel    Lipid Panel with Direct LDL reflex    T4, free    TSH, 3rd generation    Testosterone, free, total    Need for vaccination        Relevant Orders    TDAP VACCINE GREATER THAN OR EQUAL TO 6YO IM (Completed)    PNEUMOCOCCAL POLYSACCHARIDE VACCINE 23-VALENT =>3YO SQ IM (Completed)          Return for Next scheduled follow up  Subjective:   Chief Complaint   Patient presents with    Personal Problem     NeedsHep C, HIV & CRC Screening - Pneumovax 23 & Tdap vaccines        Patient ID: John Varma is a 61 y o  male presents today for evaluation of erectile dysfunction  John Varma is a 61 y o  male who presents today with trouble achieving and maintaining an erection for over a year  He stopped the buspirone and did not feel that he needed anymore  There are no urinary symptoms  There is no pain or discharge  There are no fevers or chills  He has been under a lot of stress with his shoulder,    He is requesting trazodone to help with his sleep- that has worked well for him in the past     He has had 3 surgeries for a left torn rotator cuff  He is still seeing orthopedics  He has stress with being out of work for a year  There is no depression or fatigue  The patient currently denies any nausea, vomiting, or GERD symptoms  he has normal bowel movements and normal urine output  he has a normal appetite  He is smoking 1/2 ppd  There is no exercise dysfunction  There are no fevers chills or night sweats  He has problems falling asleep        The following portions of the patient's history were reviewed and updated as appropriate: allergies, current medications, past family history, past medical history, past social history, past surgical history and problem list   Patient Active Problem List   Diagnosis    Allergic rhinitis    Anxiety disorder    COPD [facility]chronic obstructive pulmonary disease) ([facility] [address] )    Insomnia    Multiple nevi    Nicotine dependence    Nausea & vomiting    Gastroenteritis    S/P arthroscopy of left shoulder    Erectile dysfunction     Past Medical History:   Diagnosis Date    Anxiety disorder     last assessed - 21Jun2012    Degeneration of cervical intervertebral disc     last assessed - [id number]    Depression     Gastroenteritis     last assessed - [id number]    Head injury     last assessed - 15Apr2014    Impingement syndrome of left shoulder     last assessed - 05Oct2015    Rotator cuff disorder, left 10/22/2020     Past Surgical History:   Procedure Laterality Date    HAND SURGERY       No Known Allergies  Family History   Problem Relation Age of Onset    Heart disease Mother         Cardiac disorder    Heart disease Family         Cardiac disorder     Social History     Socioeconomic History    Marital status: /Civil Union     Spouse name: Not on file    Number of children: Not on file    Years of education: Not on file    Highest education level: Not on file   Occupational History    Occupation: Professional specialty position   Tobacco Use    Smoking status: Current Every Day Smoker    Smokeless tobacco: Never Used    Tobacco comment: 35 years  Vaping Use    Vaping Use: Never used   Substance and Sexual Activity    Alcohol use:  Yes    Drug use: No     Comment: Denied history uses  drugs    Sexual activity: Not on file   Other Topics Concern    Not on file   Social History Narrative    Lives with spouse     Social Determinants of Health     Financial Resource Strain:     Difficulty of Paying Living Expenses:    Food Insecurity:     Worried About Running Out of Food in the Last Year:     [address] in the Last Year:    Transportation Needs:  Lack of Transportation (Medical):  Lack of Transportation (Non-Medical):    Physical Activity:     Days of Exercise per Week:     Minutes of Exercise per Session:    Stress:     Feeling of Stress :    Social Connections:     Frequency of Communication with Friends and Family:     Frequency of Social Gatherings with Friends and Family:     Attends Sikh Services:     Active Member of Clubs or Organizations:     Attends Club or Organization Meetings:     Marital Status:    Intimate Partner Violence:     Fear of Current or Ex-Partner:     Emotionally Abused:     Physically Abused:     Sexually Abused:      Current Outpatient Medications on File Prior to Visit   Medication Sig Dispense Refill    oxyCODONE-acetaminophen (PERCOCET) 5-325 mg per tablet PLEASE SEE ATTACHED FOR DETAILED DIRECTIONS       No current facility-administered medications on file prior to visit  Review of Systems   Constitutional: Negative  HENT: Negative  Eyes: Negative  Respiratory: Negative  Cardiovascular: Negative  Gastrointestinal: Negative  Endocrine: Negative  Genitourinary: Negative  Musculoskeletal: Negative  Skin: Negative  Allergic/Immunologic: Negative  Neurological: Negative  Hematological: Negative  Psychiatric/Behavioral: Negative  Objective:  Vitals:    08/12/21 0931   BP: 132/82   BP Location: Left arm   Patient Position: Sitting   Cuff Size: Standard   Pulse: 96   Resp: 16   Temp: 97 8 °F (36 6 °C)   TempSrc: Tympanic   SpO2: 98%   Weight: 83 kg (183 lb)   Height: 6' (1 829 m)     Body mass index is 24 82 kg/m²  Physical Exam  Vitals and nursing note reviewed  Constitutional:       General: He is not in acute distress  Appearance: He is well-developed  He is not diaphoretic  Eyes:      Pupils: Pupils are equal, round, and reactive to light  Neck:      Thyroid: No thyromegaly  Vascular: No JVD  Trachea: No tracheal deviation  Cardiovascular:      Rate and Rhythm: Normal rate and regular rhythm  Heart sounds: Normal heart sounds  No murmur heard  No friction rub  No gallop  Pulmonary:      Effort: Pulmonary effort is normal  No respiratory distress  Breath sounds: Normal breath sounds  No stridor  No wheezing or rales  Chest:      Chest wall: No tenderness  Abdominal:      General: Bowel sounds are normal  There is no distension  Palpations: Abdomen is soft  There is no mass  Tenderness: There is no abdominal tenderness  There is no guarding or rebound  Musculoskeletal:         General: Normal range of motion  Cervical back: Normal range of motion and neck supple  Lymphadenopathy:      Cervical: No cervical adenopathy  Skin:     General: Skin is warm and dry  Coloration: Skin is not pale  Findings: No erythema or rash  Neurological:      Mental Status: He is alert and oriented to person, place, and time  Cranial Nerves: No cranial nerve deficit  Motor: No abnormal muscle tone  Coordination: Coordination normal       Deep Tendon Reflexes: Reflexes are normal and symmetric  Reflexes normal              Tobacco Cessation Counseling: Tobacco cessation counseling was provided   The patient is sincerely urged to quit consumption of tobacco  He is not ready to quit tobacco

## 2021-08-16 PROBLEM — Z98.890 S/P ARTHROSCOPY OF LEFT SHOULDER: Status: ACTIVE | Noted: 2020-12-21

## 2021-08-16 PROBLEM — M67.912 ROTATOR CUFF DISORDER, LEFT: Status: RESOLVED | Noted: 2020-10-22 | Resolved: 2021-08-16

## 2021-08-16 PROBLEM — M67.912 ROTATOR CUFF DISORDER, LEFT: Status: ACTIVE | Noted: 2020-10-22

## 2021-08-16 PROBLEM — N52.9 ERECTILE DYSFUNCTION: Status: ACTIVE | Noted: 2021-08-16

## 2021-08-16 NOTE — ASSESSMENT & PLAN NOTE
The patient was given a prescription to try Viagra as ordered  We will send him for the lab work as ordered to look for any underlying causes  We will monitor him closely  I suspect that is the results of his multiple stressors and is transient  We will follow up with the testing when available and have further instructions at that time

## 2021-09-29 ENCOUNTER — TELEPHONE (OUTPATIENT)
Dept: FAMILY MEDICINE CLINIC | Facility: CLINIC | Age: 56
End: 2021-09-29

## 2022-02-23 ENCOUNTER — TELEPHONE (OUTPATIENT)
Dept: FAMILY MEDICINE CLINIC | Facility: CLINIC | Age: 57
End: 2022-02-23

## 2022-04-07 ENCOUNTER — OFFICE VISIT (OUTPATIENT)
Dept: URGENT CARE | Facility: CLINIC | Age: 57
End: 2022-04-07
Payer: COMMERCIAL

## 2022-04-07 VITALS — RESPIRATION RATE: 16 BRPM | TEMPERATURE: 97.2 F | HEART RATE: 90 BPM | OXYGEN SATURATION: 97 %
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
## 2022-04-07 DIAGNOSIS — S20.461A TICK BITE OF RIGHT BACK WALL OF THORAX, INITIAL ENCOUNTER: ICD-10-CM

## 2022-04-07 DIAGNOSIS — R21 RASH: Primary | ICD-10-CM

## 2022-04-07 PROCEDURE — G0382 LEV 3 HOSP TYPE B ED VISIT: HCPCS | Performed by: PHYSICIAN ASSISTANT

## 2022-04-07 PROCEDURE — S9083 URGENT CARE CENTER GLOBAL: HCPCS | Performed by: PHYSICIAN ASSISTANT

## 2022-04-07 RX ORDER — DOXYCYCLINE HYCLATE 100 MG/1
100 CAPSULE ORAL EVERY 12 HOURS SCHEDULED
Qty: 14 CAPSULE | Refills: 0 | Status: SHIPPED | OUTPATIENT
Start: 2022-04-07 | End: 2022-04-14

## 2022-04-07 NOTE — PATIENT INSTRUCTIONS
Tick Bite   AMBULATORY CARE:   What you need to know about a tick bite:  Ticks need to be removed quickly  Most tick bites are not dangerous, but ticks can pass disease or infection when they bite  Diseases include Lyme disease, babesiosis, tularemia, and [place] Spotted Fever  Signs and symptoms of a tick bite  may develop right away, or weeks or even months after a bite  You may have redness, pain, itching, and swelling near the bite area  Blisters may also develop  You may develop tick paralysis, a temporary condition that causes problems with leg movement  A disease from a tick bite may cause a fever, rash, body aches, or breathing problems  Seek care immediately if:   · You have trouble walking or moving your legs  · You have joint pain, muscle pain, or muscle weakness within 1 month of a tick bite  · You have a fever, chills, headache, or rash  Call your doctor if:   · You cannot remove the tick  · The tick's head is stuck in your skin  · You have questions or concerns about your condition or care  Treatment:  Treatment for a disease passed during the bite depends on the disease  You may only need medicines to lower a fever or fight a bacterial infection  More serious diseases can cause conditions such as breathing problems that need to be treated in a hospital  The following can help treat symptoms at the bite area:  · Apply ice  on your bite for 15 to 20 minutes every hour or as directed  Use an ice pack, or put crushed ice in a plastic bag  Cover it with a towel before you apply it to your skin  Ice helps prevent tissue damage and decreases swelling and pain  · Medicines  help decrease pain, redness, itching, and swelling  You may also need medicine to prevent or fight a bacterial infection  These medicines may be given as a cream, lotion, or pill  How to remove a tick:  Remove the tick as soon as possible to help prevent disease or infection   You are less likely to get sick from a tick bite if you remove the tick within 24 hours  Do not use petroleum jelly, nail polish, rubbing alcohol, or heat  These do not work and may be dangerous  Do the following to remove a tick:  · First, try a soapy cotton ball  Soak a cotton ball in liquid soap  Cover the tick with the cotton ball for 30 seconds  The tick may come off with the cotton ball when you pull it away  · Use tweezers if the soapy cotton ball does not work  Grasp the tick as close to your skin as possible  Pull the tick straight up and out  Do not touch the tick with your bare hands  · Do not twist or jerk the tick suddenly, because this may break off the tick's head or mouth parts  Do not leave any part of the tick in your skin  · Do not crush or squeeze the tick since its body may be infected with germs  Flush the tick down the toilet  · After the tick is removed, clean the area of the bite with rubbing alcohol  Then wash your hands with soap and water  Prevent a tick bite:  Ticks live in areas covered by brush and grass  They may even be found in your lawn if you live in certain areas  Outdoor pets can carry ticks inside the house  Ticks can grab onto you or your clothes when you walk by grass or brush  If you go into areas that contain many trees, tall grasses, and underbrush, do the following:  · Wear light colored pants and a long-sleeved shirt  Tuck your pants into your socks or boots  Tuck in your shirt  Wear sleeves that fit close to the skin at your wrists and neck  This will help prevent ticks from crawling through gaps in your clothing and onto your skin  Wear a hat in areas with trees  · Apply insect repellant on your skin  The insect repellant should contain DEET  Do not put insect repellant on skin that is cut, scratched, or irritated  Always use soap and water to wash the insect repellant off as soon as possible once you are indoors   Do not apply insect repellant on your child's face or hands     · Spray insect repellant onto your clothes  Use permethrin spray  This spray kills ticks that crawl on your clothing  Be sure to spray the tops of your boots, bottom of pant legs, and sleeve cuffs  As soon as possible, wash and dry clothing in hot water and high heat  · Check your clothing, hair, and skin for ticks  Shower within 2 hours of coming indoors  Carefully check the hairline, armpits, neck, and waist  Check your pets and children for ticks  Remove ticks from pets the same way as you remove them from people  · Decrease the risk for ticks in your yard  Ticks like to live in shady, moist areas  [place] your lawn regularly to keep the grass short  Trim the grass around birdbaths and fences  Cut branches that are overgrown and take them out of the yard  Clear out leaf piles  [place] firewood in a dry, sofya area  Follow up with your doctor as directed:  Write down your questions so you remember to ask them during your visits  © Copyright [employer] 2022 Information is for End User's use only and may not be sold, redistributed or otherwise used for commercial purposes  All illustrations and images included in CareNotes® are the copyrighted property of A D A M [employer] Inc  or [facility] [employer]   The above information is an  only  It is not intended as medical advice for individual conditions or treatments  Talk to your doctor, nurse or pharmacist before following any medical regimen to see if it is safe and effective for you  Lyme Disease   AMBULATORY CARE:   Lyme disease  is a bacterial infection caused by the bite of an infected tick    Common signs and symptoms:   · A red rash that is often round and may look like a target or bull's eye    · Fever, chills, or sore throat    · Weakness and tiredness    · Headache or muscle aches    · Joint pain    · Abdominal pain, nausea, or diarrhea    Call your local emergency number (911 in the [address] [place] [address],[address]) if:   · Your heart is beating faster than usual and you feel dizzy  · You have chest pain or trouble breathing  · You suddenly cannot talk or see well, or you have trouble moving an area of your body  Seek immediate care if:   · You have a headache and a stiff neck  · You have trouble concentrating or thinking clearly  · You have numbness or tingling in your arms or legs, or you have trouble walking  Call your doctor if:   · Your rash grows or spreads to other areas of your body  · You suddenly have trouble falling or staying asleep  · You have new or worsening pain and swelling in your joints  · You have new or worsening weakness and muscle pain  · You have a new tick bite  · You have questions or concerns about your condition or care  Treatment for Lyme disease  includes antibiotics to treat the bacterial infection  Prevent a tick bite:  Ticks live in areas covered by brush and grass  They may even be found in your lawn if you live in certain areas  Outdoor pets can carry ticks inside the house  Ticks can grab onto you or your clothes when you walk by grass or brush  If you go into areas that contain many trees, tall grasses, and underbrush, do the following:     · Wear light colored pants and a long-sleeved shirt  Tuck your pants into your socks or boots  Tuck in your shirt  Wear sleeves that fit close to the skin at your wrists and neck  This will help prevent ticks from crawling through gaps in your clothing and onto your skin  Wear a hat in areas with trees  · Apply insect repellant on your skin  The insect repellant should contain DEET  Do not put insect repellant on skin that is cut, scratched, or irritated  Always use soap and water to wash the insect repellant off as soon as possible once you are indoors  Do not apply insect repellant on your child's face or hands  · Spray insect repellant onto your clothes  Use permethrin spray  This spray kills ticks that crawl on your clothing   Be sure to spray the tops of your boots, bottom of pant legs, and sleeve cuffs  As soon as possible, wash and dry clothing in hot water and high heat  · Check your and your child's clothing, hair, and skin for ticks  Shower within 2 hours of coming indoors  Carefully check the hairline, armpits, neck, and waist     · Decrease the risk for ticks in your yard  Ticks like to live in shady, moist areas  [place] your lawn regularly to keep the grass short  Trim the grass around birdbaths and fences  Cut branches that are overgrown and take them out of the yard  Clear out leaf piles  [place]rle [place] firewood in a dry, sofya area  · Treat pets with tick control products  as directed  This will decrease your risk for a tick bite  Check your pets for ticks  Remove ticks from pets the same way as you remove them from people  Ask your pet's  about the best product to use on your pet  · Remove a tick with tweezers  Wear gloves  Grasp the tick as close to your skin as possible  Pull the tick straight up and out  Do not touch the tick with your bare hands  Check to make sure you removed the whole tick, including the head  Clean the area with soap and water or rubbing alcohol  Then wash your hands with soap and water  Follow up with your doctor as directed:  Write down your questions so you remember to ask them during your visits  © Copyright [employer] 2022 Information is for End User's use only and may not be sold, redistributed or otherwise used for commercial purposes  All illustrations and images included in CareNotes® are the copyrighted property of [employer]  or Mary Ellen Crandall   The above information is an  only  It is not intended as medical advice for individual conditions or treatments  Talk to your doctor, nurse or pharmacist before following any medical regimen to see if it is safe and effective for you

## 2022-04-07 NOTE — PROGRESS NOTES
[facility] Now        NAME: Mayito Fraire is a 61 y o  male  : 1965    MRN: [id number]  DATE: 2022  TIME: 12:35 PM    Assessment and Plan   Rash [R21]  1  Rash     2  Tick bite of right back wall of thorax, initial encounter  doxycycline hyclate (VIBRAMYCIN) 100 mg capsule         Patient Instructions       Follow up with PCP in 3-5 days  Proceed to  ER if symptoms worsen  Chief Complaint     Chief Complaint   Patient presents with    Tick Removal     pt removed a tick from his upper right flank about 4-5 days ago  he reports the site is red, sore and itchy         History of Present Illness       69-year-old male presents to the clinic with an area of redness and itching to the right-sided lateral back  Patient states that approximately 4-5 days ago he had a tick removed from the area  Patient believes that the whole tick was removed but states that he has noticed that the area has been red, sore and itchy  Patient denies any fevers, chills, joint pain, muscle aches  Review of Systems   Review of Systems   Constitutional: Negative for chills and fever  HENT: Negative for facial swelling, sore throat, trouble swallowing and voice change  Eyes: Negative for photophobia, pain, discharge, redness, itching and visual disturbance  Respiratory: Negative for cough, chest tightness, shortness of breath and wheezing  Cardiovascular: Negative for chest pain and palpitations  Musculoskeletal: Negative for myalgias  Skin: Positive for color change and rash  Neurological: Negative for dizziness, weakness, light-headedness and headaches           Current Medications       Current Outpatient Medications:     doxycycline hyclate (VIBRAMYCIN) 100 mg capsule, Take 1 capsule (100 mg total) by mouth every 12 (twelve) hours for 7 days, Disp: 14 capsule, Rfl: 0    oxyCODONE-acetaminophen (PERCOCET) 5-325 mg per tablet, PLEASE SEE ATTACHED FOR DETAILED DIRECTIONS, Disp: , Rfl:    sildenafil (VIAGRA) 50 MG tablet, Take 1 tablet (50 mg total) by mouth daily as needed for erectile dysfunction, Disp: 10 tablet, Rfl: 3    traZODone (DESYREL) 50 mg tablet, TAKE 1 TABLET BY MOUTH DAILY AT BEDTIME, Disp: 30 tablet, Rfl: 0    Current Allergies     Allergies as of 04/07/2022    (No Known Allergies)            The following portions of the patient's history were reviewed and updated as appropriate: allergies, current medications, past family history, past medical history, past social history, past surgical history and problem list      Past Medical History:   Diagnosis Date    Anxiety disorder     last assessed - 21Jun2012    Degeneration of cervical intervertebral disc     last assessed - [id number]    Depression     Gastroenteritis     last assessed - 25Aug2016    Head injury     last assessed - 15Apr2014    Impingement syndrome of left shoulder     last assessed - 05Oct2015    Rotator cuff disorder, left 10/22/2020       Past Surgical History:   Procedure Laterality Date    HAND SURGERY         Family History   Problem Relation Age of Onset    Heart disease Mother         Cardiac disorder    Heart disease Family         Cardiac disorder         Medications have been verified  Objective   Pulse 90   Temp (!) 97 2 °F (36 2 °C)   Resp 16   SpO2 97%   No LMP for male patient  Physical Exam     Physical Exam  Vitals and nursing note reviewed  Constitutional:       General: He is not in acute distress  Appearance: He is well-developed  He is not diaphoretic  HENT:      Head: Normocephalic and atraumatic  Pulmonary:      Effort: Pulmonary effort is normal    Musculoskeletal:         General: Normal range of motion  Skin:     General: Skin is warm and dry  Capillary Refill: Capillary refill takes less than 2 seconds  Findings: Erythema and rash present  Neurological:      Mental Status: He is alert and oriented to person, place, and time

## 2022-06-01 ENCOUNTER — VBI (OUTPATIENT)
Dept: ADMINISTRATIVE | Facility: OTHER | Age: 57
End: 2022-06-01

## 2022-09-26 ENCOUNTER — TELEPHONE (OUTPATIENT)
Dept: FAMILY MEDICINE CLINIC | Facility: CLINIC | Age: 57
End: 2022-09-26

## 2022-09-26 NOTE — TELEPHONE ENCOUNTER
We had received a record release for patient from [facility] in [place], [place]  Please remove Dr Morenita Wilkerson as pcp   Thank you

## 2022-09-30 ENCOUNTER — TELEPHONE (OUTPATIENT)
Dept: FAMILY MEDICINE CLINIC | Facility: CLINIC | Age: 57
End: 2022-09-30

## 2022-10-11 NOTE — TELEPHONE ENCOUNTER
10/11/22 3:10 PM        Thank you for your request  Your request has been received, reviewed, and the patient chart updated  The PCP has successfully been removed with a patient attribution note  This message will now be completed          Thank you  Neal Silva

## 2023-05-11 ENCOUNTER — OFFICE VISIT (OUTPATIENT)
Dept: URGENT CARE | Facility: CLINIC | Age: 58
End: 2023-05-11

## 2023-05-11 ENCOUNTER — HOSPITAL ENCOUNTER (EMERGENCY)
Facility: HOSPITAL | Age: 58
Discharge: HOME | End: 2023-05-11
Attending: EMERGENCY MEDICINE

## 2023-05-11 ENCOUNTER — APPOINTMENT (EMERGENCY)
Dept: RADIOLOGY | Facility: HOSPITAL | Age: 58
End: 2023-05-11

## 2023-05-11 ENCOUNTER — APPOINTMENT (EMERGENCY)
Dept: CT IMAGING | Facility: HOSPITAL | Age: 58
End: 2023-05-11

## 2023-05-11 VITALS
DIASTOLIC BLOOD PRESSURE: 80 MMHG | SYSTOLIC BLOOD PRESSURE: 135 MMHG | RESPIRATION RATE: 17 BRPM | TEMPERATURE: 98 F | OXYGEN SATURATION: 98 % | HEART RATE: 80 BPM

## 2023-05-11 VITALS
OXYGEN SATURATION: 96 % | RESPIRATION RATE: 16 BRPM | DIASTOLIC BLOOD PRESSURE: 78 MMHG | SYSTOLIC BLOOD PRESSURE: 122 MMHG | HEART RATE: 120 BPM | TEMPERATURE: 99.1 F

## 2023-05-11 DIAGNOSIS — R11.0 NAUSEA: ICD-10-CM

## 2023-05-11 DIAGNOSIS — R22.2 INTRATHORACIC MASS: Primary | ICD-10-CM

## 2023-05-11 DIAGNOSIS — J20.9 ACUTE BRONCHITIS: ICD-10-CM

## 2023-05-11 DIAGNOSIS — R10.9 ABDOMINAL PAIN: ICD-10-CM

## 2023-05-11 DIAGNOSIS — R05.1 ACUTE COUGH: ICD-10-CM

## 2023-05-11 DIAGNOSIS — J44.1 COPD WITH ACUTE EXACERBATION (HCC): ICD-10-CM

## 2023-05-11 DIAGNOSIS — R10.32 LEFT LOWER QUADRANT ABDOMINAL PAIN: Primary | ICD-10-CM

## 2023-05-11 DIAGNOSIS — R00.0 TACHYCARDIA: ICD-10-CM

## 2023-05-11 LAB
ALBUMIN SERPL BCP-MCNC: 3.9 G/DL (ref 3.5–5)
ALP SERPL-CCNC: 75 U/L (ref 34–104)
ALT SERPL W P-5'-P-CCNC: 18 U/L (ref 7–52)
ANION GAP SERPL CALCULATED.3IONS-SCNC: 9 MMOL/L (ref 4–13)
AST SERPL W P-5'-P-CCNC: 14 U/L (ref 13–39)
BACTERIA UR QL AUTO: ABNORMAL /HPF
BASOPHILS # BLD AUTO: 0.05 THOUSANDS/ÂΜL (ref 0–0.1)
BASOPHILS NFR BLD AUTO: 1 % (ref 0–1)
BILIRUB SERPL-MCNC: 0.55 MG/DL (ref 0.2–1)
BILIRUB UR QL STRIP: NEGATIVE
BUN SERPL-MCNC: 16 MG/DL (ref 5–25)
CALCIUM SERPL-MCNC: 9.6 MG/DL (ref 8.4–10.2)
CHLORIDE SERPL-SCNC: 104 MMOL/L (ref 96–108)
CLARITY UR: CLEAR
CO2 SERPL-SCNC: 25 MMOL/L (ref 21–32)
COLOR UR: YELLOW
CREAT SERPL-MCNC: 0.94 MG/DL (ref 0.6–1.3)
EOSINOPHIL # BLD AUTO: 0.1 THOUSAND/ÂΜL (ref 0–0.61)
EOSINOPHIL NFR BLD AUTO: 1 % (ref 0–6)
ERYTHROCYTE [DISTWIDTH] IN BLOOD BY AUTOMATED COUNT: 12.1 % (ref 11.6–15.1)
GFR SERPL CREATININE-BSD FRML MDRD: 88 ML/MIN/1.73SQ M
GLUCOSE SERPL-MCNC: 113 MG/DL (ref 65–140)
GLUCOSE UR STRIP-MCNC: NEGATIVE MG/DL
HCT VFR BLD AUTO: 49.9 % (ref 36.5–49.3)
HGB BLD-MCNC: 16.7 G/DL (ref 12–17)
HGB UR QL STRIP.AUTO: NEGATIVE
IMM GRANULOCYTES # BLD AUTO: 0.05 THOUSAND/UL (ref 0–0.2)
IMM GRANULOCYTES NFR BLD AUTO: 1 % (ref 0–2)
KETONES UR STRIP-MCNC: NEGATIVE MG/DL
LEUKOCYTE ESTERASE UR QL STRIP: ABNORMAL
LIPASE SERPL-CCNC: 137 U/L (ref 11–82)
LYMPHOCYTES # BLD AUTO: 1.87 THOUSANDS/ÂΜL (ref 0.6–4.47)
LYMPHOCYTES NFR BLD AUTO: 19 % (ref 14–44)
MCH RBC QN AUTO: 29.8 PG (ref 26.8–34.3)
MCHC RBC AUTO-ENTMCNC: 33.5 G/DL (ref 31.4–37.4)
MCV RBC AUTO: 89 FL (ref 82–98)
MONOCYTES # BLD AUTO: 1.08 THOUSAND/ÂΜL (ref 0.17–1.22)
MONOCYTES NFR BLD AUTO: 11 % (ref 4–12)
MUCOUS THREADS UR QL AUTO: ABNORMAL
NEUTROPHILS # BLD AUTO: 6.9 THOUSANDS/ÂΜL (ref 1.85–7.62)
NEUTS SEG NFR BLD AUTO: 67 % (ref 43–75)
NITRITE UR QL STRIP: NEGATIVE
NON-SQ EPI CELLS URNS QL MICRO: ABNORMAL /HPF
NRBC BLD AUTO-RTO: 0 /100 WBCS
PH UR STRIP.AUTO: 6.5 [PH]
PLATELET # BLD AUTO: 317 THOUSANDS/UL (ref 149–390)
PMV BLD AUTO: 9 FL (ref 8.9–12.7)
POTASSIUM SERPL-SCNC: 3.8 MMOL/L (ref 3.5–5.3)
PROT SERPL-MCNC: 7 G/DL (ref 6.4–8.4)
PROT UR STRIP-MCNC: ABNORMAL MG/DL
RBC # BLD AUTO: 5.6 MILLION/UL (ref 3.88–5.62)
RBC #/AREA URNS AUTO: ABNORMAL /HPF
SODIUM SERPL-SCNC: 138 MMOL/L (ref 135–147)
SP GR UR STRIP.AUTO: 1.02 (ref 1–1.03)
UROBILINOGEN UR STRIP-ACNC: 2 MG/DL
WBC # BLD AUTO: 10.05 THOUSAND/UL (ref 4.31–10.16)
WBC #/AREA URNS AUTO: ABNORMAL /HPF

## 2023-05-11 RX ORDER — GUAIFENESIN/DEXTROMETHORPHAN 100-10MG/5
10 SYRUP ORAL ONCE
Status: COMPLETED | OUTPATIENT
Start: 2023-05-11 | End: 2023-05-11

## 2023-05-11 RX ORDER — GUAIFENESIN/DEXTROMETHORPHAN 100-10MG/5
5 SYRUP ORAL 3 TIMES DAILY PRN
Qty: 237 ML | Refills: 0 | Status: SHIPPED | OUTPATIENT
Start: 2023-05-11

## 2023-05-11 RX ORDER — DOXYCYCLINE HYCLATE 100 MG/1
100 CAPSULE ORAL 2 TIMES DAILY
Qty: 14 CAPSULE | Refills: 0 | Status: SHIPPED | OUTPATIENT
Start: 2023-05-11 | End: 2023-05-18

## 2023-05-11 RX ORDER — ONDANSETRON 4 MG/1
4 TABLET, FILM COATED ORAL EVERY 6 HOURS
Qty: 12 TABLET | Refills: 0 | Status: SHIPPED | OUTPATIENT
Start: 2023-05-11

## 2023-05-11 RX ADMIN — SODIUM CHLORIDE 1000 ML: 0.9 INJECTION, SOLUTION INTRAVENOUS at 15:40

## 2023-05-11 RX ADMIN — IOHEXOL 100 ML: 350 INJECTION, SOLUTION INTRAVENOUS at 17:47

## 2023-05-11 RX ADMIN — GUAIFENESIN SYRUP AND DEXTROMETHORPHAN 10 ML: 100; 10 SYRUP ORAL at 18:51

## 2023-05-11 NOTE — ED PROVIDER NOTES
"History  Chief Complaint   Patient presents with   • Abdominal Pain     Lower abd pain for 1 week, having 2-4 episodes of diarrhea, having nausea and vomiting as well, decreased appetite  \"I did not eat at all today\" denies trouble with urination      57-year-old male with history of anxiety, COPD, depression and insomnia complains of left-sided abdominal pain, anorexia, intermittent nausea and vomiting for the past 7 to 10 days  Denies fever and dysuria  States pain is constant without exacerbating or alleviating factors  Denies recent foreign travel, spoiled food or ill contacts  Denies change in medications  Urgent care briefly examined patient today and sent him here for further work-up  Also admits to cough productive of brown sputum for the past week or so  No dyspnea  Smokes daily  Denies prior intra-abdominal surgery, nephrolithiasis  Prior to Admission Medications   Prescriptions Last Dose Informant Patient Reported?  Taking?   oxyCODONE-acetaminophen (PERCOCET) 5-325 mg per tablet   Yes No   Sig: PLEASE SEE ATTACHED FOR DETAILED DIRECTIONS   Patient not taking: Reported on 5/11/2023   sildenafil (VIAGRA) 50 MG tablet   No No   Sig: Take 1 tablet (50 mg total) by mouth daily as needed for erectile dysfunction   Patient not taking: Reported on 5/11/2023   traZODone (DESYREL) 50 mg tablet   No No   Sig: TAKE 1 TABLET BY MOUTH DAILY AT BEDTIME      Facility-Administered Medications: None       Past Medical History:   Diagnosis Date   • Anxiety disorder     last assessed - 21Jun2012   • Degeneration of cervical intervertebral disc     last assessed - [id number]   • Depression    • Gastroenteritis     last assessed - [id number]   • Head injury     last assessed - 15Apr2014   • Impingement syndrome of left shoulder     last assessed - [id number]   • Rotator cuff disorder, left 10/22/2020       Past Surgical History:   Procedure Laterality Date   • HAND SURGERY         Family History   Problem " Relation Age of Onset   • Heart disease Mother         Cardiac disorder   • Heart disease Family         Cardiac disorder     I have reviewed and agree with the history as documented  E-Cigarette/Vaping   • E-Cigarette Use Never User      E-Cigarette/Vaping Substances   • Nicotine No    • THC No    • CBD No    • Flavoring No    • Other No    • Unknown No      Social History     Tobacco Use   • Smoking status: Every Day   • Smokeless tobacco: Never   • Tobacco comments:     35 years  Vaping Use   • Vaping Use: Never used   Substance Use Topics   • Alcohol use: Yes   • Drug use: No     Comment: Denied history uses  drugs       Review of Systems   Constitutional: Positive for appetite change and unexpected weight change  Negative for fever  HENT: Negative for congestion and sore throat  Respiratory: Positive for cough  Negative for shortness of breath  Cardiovascular: Negative for chest pain, palpitations and leg swelling  Gastrointestinal: Positive for abdominal pain, diarrhea and vomiting  Negative for abdominal distention and blood in stool  Genitourinary: Negative for difficulty urinating, dysuria and flank pain  Musculoskeletal: Negative for back pain  Skin: Negative for rash and wound  Neurological: Negative for syncope  Physical Exam  Physical Exam  Vitals and nursing note reviewed  Constitutional:       General: He is not in acute distress  Appearance: He is well-developed  He is not ill-appearing or diaphoretic  HENT:      Head: Normocephalic and atraumatic  Right Ear: External ear normal       Left Ear: External ear normal       Mouth/Throat:      Mouth: Mucous membranes are moist       Pharynx: Oropharynx is clear  Eyes:      General: No scleral icterus  Conjunctiva/sclera: Conjunctivae normal       Pupils: Pupils are equal, round, and reactive to light  Neck:      Vascular: No JVD  Cardiovascular:      Rate and Rhythm: Regular rhythm   Tachycardia present  Heart sounds: Normal heart sounds  No murmur heard  Pulmonary:      Effort: Pulmonary effort is normal       Breath sounds: Normal breath sounds  Abdominal:      General: Abdomen is flat  Bowel sounds are decreased  There is no abdominal bruit  Palpations: Abdomen is soft  There is no fluid wave or mass  Tenderness: There is abdominal tenderness in the left upper quadrant and left lower quadrant  There is left CVA tenderness and guarding  There is no right CVA tenderness or rebound  Negative signs include Zaidi's sign, Rovsing's sign, McBurney's sign and psoas sign  Hernia: No hernia is present  Musculoskeletal:         General: No tenderness  Normal range of motion  Cervical back: Normal range of motion and neck supple  Lymphadenopathy:      Cervical: No cervical adenopathy  Skin:     General: Skin is warm and dry  Capillary Refill: Capillary refill takes less than 2 seconds  Findings: No rash  Neurological:      General: No focal deficit present  Mental Status: He is alert and oriented to person, place, and time  Cranial Nerves: No cranial nerve deficit  Motor: No weakness  Coordination: Coordination normal       Deep Tendon Reflexes: Reflexes are normal and symmetric     Psychiatric:         Mood and Affect: Mood normal          Behavior: Behavior normal          Vital Signs  ED Triage Vitals   Temperature Pulse Respirations Blood Pressure SpO2   05/11/23 1457 05/11/23 1457 05/11/23 1457 05/11/23 1458 05/11/23 1457   98 °F (36 7 °C) (!) 109 18 125/81 96 %      Temp Source Heart Rate Source Patient Position - Orthostatic VS BP Location FiO2 (%)   05/11/23 1457 05/11/23 1457 05/11/23 1530 05/11/23 1458 --   Temporal Monitor Sitting Left arm       Pain Score       05/11/23 1457       8           Vitals:    05/11/23 1630 05/11/23 1700 05/11/23 1730 05/11/23 1830   BP: 109/72 103/72 138/83 135/80   Pulse: 91 84 85 80   Patient Position - Orthostatic VS: Sitting Sitting Sitting Sitting         Visual Acuity      ED Medications  Medications   sodium chloride 0 9 % bolus 1,000 mL (0 mL Intravenous Stopped 5/11/23 1640)   iohexol (OMNIPAQUE) 350 MG/ML injection (MULTI-DOSE) 100 mL (100 mL Intravenous Given 5/11/23 1747)   dextromethorphan-guaiFENesin (ROBITUSSIN DM) oral syrup 10 mL (10 mL Oral Given 5/11/23 1851)       Diagnostic Studies  Results Reviewed     Procedure Component Value Units Date/Time    [facility] [[id number]] Collected: 05/11/23 1539    Lab Status: Final result Specimen: Blood from Arm, Right Updated: 05/11/23 1729     Sodium 138 mmol/L      Potassium 3 8 mmol/L      Chloride 104 mmol/L      CO2 25 mmol/L      ANION GAP 9 mmol/L      BUN 16 mg/dL      Creatinine 0 94 mg/dL      Glucose 113 mg/dL      Calcium 9 6 mg/dL      AST 14 U/L      ALT 18 U/L      Alkaline Phosphatase 75 U/L      Total Protein 7 0 g/dL      Albumin 3 9 g/dL      Total Bilirubin 0 55 mg/dL      eGFR 88 ml/min/1 73sq m     Narrative:      [place] guidelines for Chronic Kidney Disease (CKD):   •  Stage 1 with normal or high GFR (GFR > 90 mL/min/1 73 square meters)  •  Stage 2 Mild CKD (GFR = 60-89 mL/min/1 73 square meters)  •  Stage 3A Moderate CKD (GFR = 45-59 mL/min/1 73 square meters)  •  Stage 3B Moderate CKD (GFR = 30-44 mL/min/1 73 square meters)  •  Stage 4 Severe CKD (GFR = 15-29 mL/min/1 73 square meters)  •  Stage 5 End Stage CKD (GFR <15 mL/min/1 73 square meters)  Note: GFR calculation is accurate only with a steady state creatinine    Lipase [[id number]]  (Abnormal) Collected: 05/11/23 1539    Lab Status: Final result Specimen: Blood from Arm, Right Updated: 05/11/23 1729     Lipase 137 u/L     Urine Microscopic [[id number]]  (Abnormal) Collected: 05/11/23 1600    Lab Status: Final result Specimen: Urine, Clean Catch Updated: 05/11/23 1658     RBC, UA None Seen /hpf      WBC, UA 1-2 /hpf      Epithelial Cells Occasional /hpf Bacteria, UA None Seen /hpf      MUCUS THREADS Occasional    UA (URINE) with reflex to Scope [[id number]]  (Abnormal) Collected: 05/11/23 1600    Lab Status: Final result Specimen: Urine, Clean Catch Updated: 05/11/23 1618     Color, UA Yellow     Clarity, UA Clear     Specific [place], UA 1 020     pH, UA 6 5     Leukocytes, UA Trace     Nitrite, UA Negative     Protein, UA 30 (1+) mg/dl      Glucose, UA Negative mg/dl      Ketones, UA Negative mg/dl      Urobilinogen, UA 2 0 mg/dl      Bilirubin, UA Negative     Occult Blood, UA Negative    CBC and differential [[id number]]  (Abnormal) Collected: 05/11/23 1539    Lab Status: Final result Specimen: Blood from Arm, Right Updated: 05/11/23 1550     WBC 10 05 Thousand/uL      RBC 5 60 Million/uL      Hemoglobin 16 7 g/dL      Hematocrit 49 9 %      MCV 89 fL      MCH 29 8 pg      MCHC 33 5 g/dL      RDW 12 1 %      MPV 9 0 fL      Platelets 599 Thousands/uL      nRBC 0 /100 WBCs      Neutrophils Relative 67 %      Immat GRANS % 1 %      Lymphocytes Relative 19 %      Monocytes Relative 11 %      Eosinophils Relative 1 %      Basophils Relative 1 %      Neutrophils Absolute 6 90 Thousands/µL      Immature Grans Absolute 0 05 Thousand/uL      Lymphocytes Absolute 1 87 Thousands/µL      Monocytes Absolute 1 08 Thousand/µL      Eosinophils Absolute 0 10 Thousand/µL      Basophils Absolute 0 05 Thousands/µL                  CT chest abdomen pelvis w contrast   Final Result by Lemuel Narvaez MD (05/11 1820)      Ovoid shaped smoothly marginated mass in the right upper lobe appears to extend into the right neural foramina likely at the level of T4-T5  Therefore, this is highly suspicious for a neurogenic tumor including etiologies such as a schwannoma  Further    evaluation therefore with an MRI of the thoracic spine without and with intravenous gadolinium is advised  Small tree-in-bud type opacities in the right upper lobe suspicious for small airways infection        5 mm pulmonary nodule right middle lobe likely benign  Sigmoid diverticulosis without acute diverticulitis  The study was marked in [facility] for immediate notification  Workstation performed: [id number]         XR chest 2 views   Final Result by Marin Bland MD (05/11 1742)      Rounded opacity to the right of the trachea localized posteriorly on the lateral view  Further evaluation with chest CT is advised  Workstation performed: [id number]                    Procedures  Procedures         ED Course  ED Course as of 05/12/23 1503   Thu May 11, 2023   1742 Chest x-ray shows large spherical soft tissue mass in medial aspect right upper lobe  Patient will have CT of abdomen pelvis and chest    1905 Prior to CT I discussed with patient the abnormal density in his right upper chest and that we need to further characterize that with CT along with his abdominal CT  He agrees  After results were discussed with patient he decided he did not want to stay any longer  He verbalized understanding that this may be a cancerous tumor and could lead to bleeding into the chest, increased pain or infection  He understands that he has exacerbation of COPD with pulmonary infection and that his abdominal CT was unremarkable  Reviewed all lab results  I told him I wanted to speak to somebody for him to follow-up with on this tumor but he refuses today  I went over his risks of leaving [address] and he agrees to leave AMA  When I went back to his room with the [place] form and instructions as well as a list of phone numbers for him to call for [facility] ([facility]) he had left the room  SBIRT 20yo+    Flowsheet Row Most Recent Value   Initial Alcohol Screen: US AUDIT-C     1  How often do you have a drink containing alcohol? 0 Filed at: 05/11/2023 1530   2   How many drinks containing alcohol do you have on a typical day you are drinking? 0 Filed at: 05/11/2023 1530   3a  Male UNDER 65: How often do you have five or more drinks on one occasion? 0 Filed at: 05/11/2023 1530   3b  FEMALE Any Age, or MALE 65+: How often do you have 4 or more drinks on one occassion? 0 Filed at: 05/11/2023 1530   Audit-C Score 0 Filed at: 05/11/2023 1530   PARI: How many times in the past year have you    Used an illegal drug or used a prescription medication for non-medical reasons? Never Filed at: 05/11/2023 1530                    Medical Decision Making  69-year-old male smoker complaining of left lower quadrant pain, anorexia, intermittent vomiting and diarrhea  Also has cough productive of brown sputum  Differential includes dehydration, electrolyte abnormality, acute kidney injury, diverticulitis, colitis, inflammatory bowel disease, pyelonephritis, ureteral lithiasis, hydronephrosis, atypical appendicitis, intra-abdominal tumor, gastroenteritis  Pulmonary symptoms likely due to exacerbation of COPD  No wheezing rhonchi or rales to suggest acute pneumonia or bronchitis  Will check labs and imaging, IV fluid resuscitation, antiemetics analgesia as needed  Chest x-ray shows a rounded density right upper lobe  Will need further characterization by CT  Lab results reviewed  Patient sent for CT of chest abdomen pelvis with IV contrast   CT shows right superior chest mass apparently arising from thoracic foramina  CT of abdomen pelvis essentially unremarkable  I discussed this with patient  I recommended that he gives me time to speak to cardiothoracic surgery, neurosurgery or pulmonary specialists regarding neck steps but patient's does not want to stay  He verbalizes understanding of risks involved and signed out AMA  Please see further discussion in chart      Abdominal pain: acute illness or injury  Acute bronchitis: acute illness or injury  COPD with acute exacerbation [facility]): acute illness or injury  Intrathoracic mass: undiagnosed new problem with uncertain prognosis  Amount and/or Complexity of Data Reviewed  Labs: ordered  Decision-making details documented in ED Course  Radiology: ordered and independent interpretation performed  Discussion of management or test interpretation with external provider(s): Discussed CT imaging with radiologist prior to ordering  Risk  OTC drugs  Prescription drug management  Disposition  Final diagnoses:   Intrathoracic mass   Acute bronchitis   COPD with acute exacerbation ([facility] [address] )   Abdominal pain     Time reflects when diagnosis was documented in both MDM as applicable and the Disposition within this note     Time User Action Codes Description Comment    5/11/2023  6:50 PM Marcene [place] Add [R22 2] Intrathoracic mass     5/11/2023  6:50 PM Marcene [place] Add [J20 9] Acute bronchitis     5/11/2023  6:50 PM Marcene [place] Add [J44 1] [facility]ation [facility] )     5/11/2023  6:50 PM Marcene [place] Add [R10 9] Abdominal pain       ED Disposition     ED Disposition   AMA    Condition   --    Date/Time   Thu May 11, 2023  7:34 PM    Comment   Date: 5/11/2023  Patient: Merline Dodd  Admitted: 5/11/2023  2:54 PM  Attending Provider: Char Blair DO    Merline Dodd or his authorized caregiver has made the decision for the patient to leave the emergency department against the a dvice of the emergency department staff  He or his authorized caregiver has been informed and understands the inherent risks, including death, bleeding in the lung, possible cancer not diagnosed  He or his authorized caregiver has decided to accept  the responsibility for this decision  Merline Dodd and all necessary parties have been advised that he may return for further evaluation or treatment  His condition at time of discharge was stable    Merline Dodd had current vital signs as f ollows:  /80 (BP Location: Left arm)   Pulse 80   Temp 98 °F (36 7 °C) (Temporal)   Resp 17 Follow-up Information     Follow up With Specialties Details Why Contact Info Additional [address]  [facility] Gastroenterology Schedule an appointment as soon as possible for a visit   [address] [facility] [phone]  [address] [facility], [address], [address], [facility] [address]  [phone] [address] [facility] Neurosurgery Schedule an appointment as soon as possible for a visit   [address] [facility] [phone]  [facility] [address], [address], [address], [place], [place], [address] [facility]    [address]  Call in 1 day To find local doctor [phone]             Discharge Medication List as of 5/11/2023  6:59 PM      START taking these medications    Details   dextromethorphan-guaiFENesin (ROBITUSSIN DM)  mg/5 mL syrup Take 5 mL by mouth 3 (three) times a day as needed for cough, Starting Thu 5/11/2023, Normal      doxycycline hyclate (VIBRAMYCIN) 100 mg capsule Take 1 capsule (100 mg total) by mouth 2 (two) times a day for 7 days, Starting Thu 5/11/2023, Until Thu 5/18/2023, Normal      ondansetron (ZOFRAN) 4 mg tablet Take 1 tablet (4 mg total) by mouth every 6 (six) hours, Starting Thu 5/11/2023, Normal         CONTINUE these medications which have NOT CHANGED    Details   oxyCODONE-acetaminophen (PERCOCET) 5-325 mg per tablet PLEASE SEE ATTACHED FOR DETAILED DIRECTIONS, Historical Med      sildenafil (VIAGRA) 50 MG tablet Take 1 tablet (50 mg total) by mouth daily as needed for erectile dysfunction, Starting Thu 8/12/2021, Normal      traZODone (DESYREL) 50 mg tablet TAKE 1 TABLET BY MOUTH DAILY AT BEDTIME, Normal             No discharge procedures on file      PDMP Review       Value Time User    PDMP Reviewed  Yes 5/11/2023  6:51 PM Grady Kelly Perlita Jack DO          ED Provider  Electronically Signed by           Char Blair DO  05/12/23 6319

## 2023-05-11 NOTE — Clinical Note
Date: 5/11/2023  Patient: Ria Myles  Admitted: 5/11/2023  2:54 PM  Attending Provider: Evy Santoyo DO    Ria Myles or his authorized caregiver has made the decision for the patient to leave the emergency department against the a dvice of the emergency department staff  He or his authorized caregiver has been informed and understands the inherent risks, including death, bleeding in the lung, possible cancer not diagnosed  He or his authorized caregiver has decided to accept  the responsibility for this decision  Ria Myles and all necessary parties have been advised that he may return for further evaluation or treatment  His condition at time of discharge was stable    Ria Myles had current vital signs as f ollows:  /80 (BP Location: Left arm)   Pulse 80   Temp 98 °F (36 7 °C) (Temporal)   Resp 17

## 2023-05-11 NOTE — DISCHARGE INSTRUCTIONS
Take the antibiotic and cough medicine as prescribed for your chest cold and flareup of COPD  Continue your other present medications  Keep well-hydrated  Cut back on smoking  Call the [facility] in the morning and tell them you need a local doctor  Call that office tell me you are here and need some assistance with your COPD, chest infection and this tumor in the chest     Call the neurosurgery group and schedule an appointment for them to see what is going on with the tumor in your chest     Schedule point with a gastroenterologist regarding your abdominal pain nausea and diarrhea  Return if problem arises